# Patient Record
Sex: FEMALE | Race: BLACK OR AFRICAN AMERICAN | Employment: OTHER | URBAN - METROPOLITAN AREA
[De-identification: names, ages, dates, MRNs, and addresses within clinical notes are randomized per-mention and may not be internally consistent; named-entity substitution may affect disease eponyms.]

---

## 2017-11-24 PROBLEM — N39.0 UTI (URINARY TRACT INFECTION): Status: ACTIVE | Noted: 2017-11-24

## 2017-11-24 PROBLEM — I72.0 CAROTID ARTERY ANEURYSM (HCC): Status: ACTIVE | Noted: 2017-11-24

## 2017-11-24 PROBLEM — A41.9 SEPSIS (HCC): Status: ACTIVE | Noted: 2017-11-24

## 2017-11-25 PROBLEM — R50.9 FEVER: Status: ACTIVE | Noted: 2017-11-25

## 2017-11-25 PROBLEM — R78.81 PNEUMOCOCCAL BACTEREMIA: Status: ACTIVE | Noted: 2017-11-25

## 2017-11-25 PROBLEM — B95.3 PNEUMOCOCCAL BACTEREMIA: Status: ACTIVE | Noted: 2017-11-25

## 2017-11-27 PROBLEM — R91.1 LUNG NODULE: Status: ACTIVE | Noted: 2017-11-27

## 2017-12-05 LAB
ALBUMIN SERPL-MCNC: 3 G/DL
ALP BLD-CCNC: 67 U/L
ALT SERPL-CCNC: 45 U/L
ANION GAP SERPL CALCULATED.3IONS-SCNC: ABNORMAL MMOL/L
AST SERPL-CCNC: 29 U/L
BASOPHILS ABSOLUTE: 0 /ΜL
BASOPHILS RELATIVE PERCENT: 0 %
BILIRUB SERPL-MCNC: 0 MG/DL (ref 0.1–1.4)
BUN BLDV-MCNC: 10 MG/DL
CALCIUM SERPL-MCNC: 9.7 MG/DL
CHLORIDE BLD-SCNC: 102 MMOL/L
CO2: 28 MMOL/L
CREAT SERPL-MCNC: 0.62 MG/DL
EOSINOPHILS ABSOLUTE: 0.1 /ΜL
EOSINOPHILS RELATIVE PERCENT: 1 %
GFR CALCULATED: 60
GLUCOSE BLD-MCNC: 99 MG/DL
HCT VFR BLD CALC: 39 % (ref 36–46)
HEMOGLOBIN: 12.3 G/DL (ref 12–16)
LYMPHOCYTES ABSOLUTE: 1.5 /ΜL
LYMPHOCYTES RELATIVE PERCENT: 26 %
MCH RBC QN AUTO: 28 PG
MCHC RBC AUTO-ENTMCNC: 32 G/DL
MCV RBC AUTO: 88 FL
MONOCYTES ABSOLUTE: 0.4 /ΜL
MONOCYTES RELATIVE PERCENT: 7 %
NEUTROPHILS ABSOLUTE: 3.7 /ΜL
NEUTROPHILS RELATIVE PERCENT: 66 %
PDW BLD-RTO: 13.4 %
PLATELET # BLD: 577 K/ΜL
PMV BLD AUTO: NORMAL FL
POTASSIUM SERPL-SCNC: 4.7 MMOL/L
RBC # BLD: 4.37 10^6/ΜL
SODIUM BLD-SCNC: 138 MMOL/L
TOTAL PROTEIN: 6.9
WBC # BLD: 5.7 10^3/ML

## 2019-04-30 ENCOUNTER — OFFICE VISIT (OUTPATIENT)
Dept: INTERNAL MEDICINE CLINIC | Age: 74
End: 2019-04-30
Payer: MEDICARE

## 2019-04-30 VITALS
BODY MASS INDEX: 25.83 KG/M2 | HEART RATE: 63 BPM | OXYGEN SATURATION: 98 % | DIASTOLIC BLOOD PRESSURE: 94 MMHG | SYSTOLIC BLOOD PRESSURE: 178 MMHG | TEMPERATURE: 98.5 F | WEIGHT: 155.2 LBS

## 2019-04-30 DIAGNOSIS — Z12.11 SCREEN FOR COLON CANCER: ICD-10-CM

## 2019-04-30 DIAGNOSIS — R91.8 ABNORMAL CT SCAN, LUNG: ICD-10-CM

## 2019-04-30 DIAGNOSIS — E78.5 DYSLIPIDEMIA: Primary | ICD-10-CM

## 2019-04-30 DIAGNOSIS — Z12.31 SCREENING MAMMOGRAM, ENCOUNTER FOR: ICD-10-CM

## 2019-04-30 DIAGNOSIS — I10 UNCONTROLLED HYPERTENSION: ICD-10-CM

## 2019-04-30 PROCEDURE — 99204 OFFICE O/P NEW MOD 45 MIN: CPT | Performed by: INTERNAL MEDICINE

## 2019-04-30 RX ORDER — LOSARTAN POTASSIUM 25 MG/1
25 TABLET ORAL DAILY
Qty: 90 TABLET | Refills: 1 | Status: SHIPPED | OUTPATIENT
Start: 2019-04-30 | End: 2019-05-31 | Stop reason: SDUPTHER

## 2019-04-30 SDOH — SOCIAL STABILITY: SOCIAL INSECURITY: WITHIN THE LAST YEAR, HAVE YOU BEEN AFRAID OF YOUR PARTNER OR EX-PARTNER?: NO

## 2019-04-30 SDOH — SOCIAL STABILITY: SOCIAL INSECURITY
WITHIN THE LAST YEAR, HAVE YOU BEEN KICKED, HIT, SLAPPED, OR OTHERWISE PHYSICALLY HURT BY YOUR PARTNER OR EX-PARTNER?: NO

## 2019-04-30 SDOH — HEALTH STABILITY: PHYSICAL HEALTH: ON AVERAGE, HOW MANY MINUTES DO YOU ENGAGE IN EXERCISE AT THIS LEVEL?: 60 MIN

## 2019-04-30 SDOH — SOCIAL STABILITY: SOCIAL NETWORK
IN A TYPICAL WEEK, HOW MANY TIMES DO YOU TALK ON THE PHONE WITH FAMILY, FRIENDS, OR NEIGHBORS?: MORE THAN THREE TIMES A WEEK

## 2019-04-30 SDOH — SOCIAL STABILITY: SOCIAL NETWORK
DO YOU BELONG TO ANY CLUBS OR ORGANIZATIONS SUCH AS CHURCH GROUPS UNIONS, FRATERNAL OR ATHLETIC GROUPS, OR SCHOOL GROUPS?: YES

## 2019-04-30 SDOH — HEALTH STABILITY: MENTAL HEALTH
STRESS IS WHEN SOMEONE FEELS TENSE, NERVOUS, ANXIOUS, OR CAN'T SLEEP AT NIGHT BECAUSE THEIR MIND IS TROUBLED. HOW STRESSED ARE YOU?: TO SOME EXTENT

## 2019-04-30 SDOH — SOCIAL STABILITY: SOCIAL INSECURITY
WITHIN THE LAST YEAR, HAVE TO BEEN RAPED OR FORCED TO HAVE ANY KIND OF SEXUAL ACTIVITY BY YOUR PARTNER OR EX-PARTNER?: NO

## 2019-04-30 SDOH — HEALTH STABILITY: PHYSICAL HEALTH: ON AVERAGE, HOW MANY DAYS PER WEEK DO YOU ENGAGE IN MODERATE TO STRENUOUS EXERCISE (LIKE A BRISK WALK)?: 2 DAYS

## 2019-04-30 SDOH — SOCIAL STABILITY: SOCIAL NETWORK: HOW OFTEN DO YOU GET TOGETHER WITH FRIENDS OR RELATIVES?: ONCE A WEEK

## 2019-04-30 SDOH — SOCIAL STABILITY: SOCIAL NETWORK: HOW OFTEN DO YOU ATTENT MEETINGS OF THE CLUB OR ORGANIZATION YOU BELONG TO?: MORE THAN 4 TIMES PER YEAR

## 2019-04-30 SDOH — SOCIAL STABILITY: SOCIAL NETWORK: HOW OFTEN DO YOU ATTEND CHURCH OR RELIGIOUS SERVICES?: NEVER

## 2019-04-30 SDOH — SOCIAL STABILITY: SOCIAL INSECURITY: WITHIN THE LAST YEAR, HAVE YOU BEEN HUMILIATED OR EMOTIONALLY ABUSED IN OTHER WAYS BY YOUR PARTNER OR EX-PARTNER?: NO

## 2019-04-30 SDOH — HEALTH STABILITY: MENTAL HEALTH: HOW MANY STANDARD DRINKS CONTAINING ALCOHOL DO YOU HAVE ON A TYPICAL DAY?: 5 OR 6

## 2019-04-30 SDOH — HEALTH STABILITY: MENTAL HEALTH: HOW OFTEN DO YOU HAVE A DRINK CONTAINING ALCOHOL?: 2-4 TIMES A MONTH

## 2019-04-30 SDOH — SOCIAL STABILITY: SOCIAL NETWORK: ARE YOU MARRIED, WIDOWED, DIVORCED, SEPARATED, NEVER MARRIED, OR LIVING WITH A PARTNER?: NEVER MARRIED

## 2019-04-30 ASSESSMENT — PATIENT HEALTH QUESTIONNAIRE - PHQ9
2. FEELING DOWN, DEPRESSED OR HOPELESS: 0
1. LITTLE INTEREST OR PLEASURE IN DOING THINGS: 0
SUM OF ALL RESPONSES TO PHQ9 QUESTIONS 1 & 2: 0
SUM OF ALL RESPONSES TO PHQ QUESTIONS 1-9: 0
SUM OF ALL RESPONSES TO PHQ QUESTIONS 1-9: 0

## 2019-05-13 ASSESSMENT — ENCOUNTER SYMPTOMS
COUGH: 0
WHEEZING: 0
ABDOMINAL PAIN: 0
CONSTIPATION: 0
EYES NEGATIVE: 1
APNEA: 0
ALLERGIC/IMMUNOLOGIC NEGATIVE: 1
BLOOD IN STOOL: 0
BLURRED VISION: 0
STRIDOR: 0
ANAL BLEEDING: 0
CHOKING: 0
NAUSEA: 0
SHORTNESS OF BREATH: 0
DIARRHEA: 0
BACK PAIN: 0
VOMITING: 0
RHINORRHEA: 0
RECTAL PAIN: 0
ORTHOPNEA: 0
CHEST TIGHTNESS: 0
FACIAL SWELLING: 0
ABDOMINAL DISTENTION: 0
GASTROINTESTINAL NEGATIVE: 1
EYE DISCHARGE: 0

## 2019-05-13 NOTE — PROGRESS NOTES
[address], [place], [place]    May 13, 2019  Reji Martinezvictorino  1945    HPI:  Naman Alcantar is a 74 y.o. female being seen today for *.c     Presents to establish care. She has not seen a doctor in several years. She has fears about medication. Past Medical History:  No date: Carotid artery aneurysm (HCC)  No date: Hypertension  No date: Lung nodule  History reviewed. No pertinent surgical history. 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[place] [address]    Social Needs      Financial resource strain: Not on file      Food insecurity:        Worry: Not on file        Inability: Not on file      Transportation needs:        Medical: Not on file        Non-medical: Not on file    Tobacco Use      Smoking status: Current Every Day Smoker        Packs/day: 1.00        Years: 50.00        Pack years: 50        Types: Cigarettes        Start date: 1968      Smokeless tobacco: Never Used    Substance and Sexual Activity      Alcohol use: Yes        Alcohol/week: 1.2 oz        Types: 2 Cans of beer per week        Frequency: 2-4 times a month        Drinks per session: 5 or 6        Binge frequency: Weekly        Comment: occasionally      Drug use: No      Sexual activity: Not Currently    Lifestyle      Physical activity:        Days per week: 2 days        Minutes per session: 60 min      Stress:  To some extent    Relationships      Social connections: 90 tablet 1     No current facility-administered medications for this visit. Review. phx  \\ of Systems:    Review of Systems   Constitutional: Negative. Negative for malaise/fatigue. HENT: Negative for congestion, ear discharge, facial swelling, hearing loss, nosebleeds, postnasal drip, rhinorrhea, sneezing and tinnitus. Eyes: Negative. Negative for blurred vision and discharge. Respiratory: Negative for apnea, cough, choking, chest tightness, shortness of breath, wheezing and stridor. Cardiovascular: Negative. Negative for chest pain, palpitations, orthopnea and leg swelling. Gastrointestinal: Negative. Negative for abdominal distention, abdominal pain, anal bleeding, blood in stool, constipation, diarrhea, nausea, rectal pain and vomiting. Endocrine: Negative. Genitourinary: Negative for difficulty urinating, dyspareunia, dysuria, frequency, genital sores, hematuria, menstrual problem, pelvic pain, vaginal discharge and vaginal pain. Musculoskeletal: Negative. Negative for arthralgias, back pain, gait problem, joint swelling, myalgias and neck pain. Skin: Negative. Negative for rash and wound. Allergic/Immunologic: Negative. Neurological: Negative. Negative for dizziness, tremors, syncope, speech difficulty, weakness, light-headedness, numbness and headaches. Hematological: Negative for adenopathy. Does not bruise/bleed easily. Psychiatric/Behavioral: Negative. Negative for agitation, behavioral problems, confusion, decreased concentration, dysphoric mood, hallucinations, self-injury, sleep disturbance and suicidal ideas. The patient is not nervous/anxious and is not hyperactive. All other systems reviewed and are negative.       Physical Exam:  @DOS@    No Known Allergies    Current Outpatient Medications   Medication Sig Dispense Refill    losartan (COZAAR) 25 MG tablet Take 1 tablet by mouth daily 90 tablet 1     No current facility-administered medications for this visit. Vitals:    04/30/19 1359   BP: (!) 178/94   Pulse: 63   Temp: 98.5 °F (36.9 °C)   TempSrc: Oral   SpO2: 98%   Weight: 155 lb 3.2 oz (70.4 kg)     Body mass index is 25.83 kg/m². Wt Readings from Last 3 Encounters:   04/30/19 155 lb 3.2 oz (70.4 kg)   11/30/17 147 lb 6.4 oz (66.9 kg)   04/03/12 155 lb (70.3 kg)     BP Readings from Last 3 Encounters:   04/30/19 (!) 178/94   12/01/17 (!) 150/69   04/03/12 140/80       Physical Exam   Constitutional: She is oriented to person, place, and time. She appears well-developed and well-nourished. HENT:   Head: Normocephalic and atraumatic. Eyes: Pupils are equal, round, and reactive to light. Conjunctivae and EOM are normal.   Neck: Normal range of motion. Neck supple. Cardiovascular: Normal rate, regular rhythm, normal heart sounds and intact distal pulses. Pulmonary/Chest: Effort normal and breath sounds normal. No respiratory distress. Musculoskeletal: Normal range of motion. She exhibits no edema. Neurological: She is alert and oriented to person, place, and time. Skin: Skin is warm. Psychiatric: She has a normal mood and affect. Her behavior is normal. Judgment and thought content normal.   Nursing note and vitals reviewed. CONSTITUTION: Well-developed, Well-nourished, in no acute distress. HENT: Normocephalic  NECK: ROM within normal limits  PULMONARY: Pulmonary effort and breath sounds are normal.  No respiratory distress, wheezes, rales, or chest tenderness noted  CARDIOVASCULAR: Regular rate and rhythm  ABDOMINAL: Abdomen soft, non-.distended,non-tender, no masses, guarding, or rebound tenderness, bowels sounds are within normal limits  CHEST: no retractions or accessory muscle use, no masses  AXILLA: no lymphadenopathy  NEUROLOGICAL: Grossly intact  EXTREMITIES: DP / TD pulses are within normal limits  SKIN: Warm and Dry  HAIR: Evenly distributed  NAILS: Without injury or abnormality    Assessment:  1. Dyslipidemia    2. Uncontrolled hypertension    3. Screening mammogram, encounter for    4. Screen for colon cancer    5. Abnormal CT scan, lung        Plan:  Cami Duarte was seen today for establish care. Diagnoses and all orders for this visit:    Dyslipidemia  -     Lipid Panel; Future    Uncontrolled hypertension  -     Comprehensive Metabolic Panel  -     Microalbumin / Creatinine Urine Ratio  -     losartan (COZAAR) 25 MG tablet; Take 1 tablet by mouth daily    Screening mammogram, encounter for  -     LIO DIGITAL SCREEN W OR WO CAD BILATERAL; Future    Screen for colon cancer  -     Direct Screening Colonoscopy Referral    Abnormal CT scan, lung  -     Low Dose Chest CT-Abnormal Lung Screen Follow up; Future        Follow-up    Return in about 1 month (around 5/28/2019) for Hypertension. Narrative   EXAMINATION:   CTA OF THE HEAD AND NECK WITH CONTRAST 11/24/2017 2:36 pm:       TECHNIQUE:   CTA of the head and neck was performed with the administration of intravenous   contrast. Multiplanar reformatted images are provided for review.  MIP images   are provided for review. Stenosis of the internal carotid arteries measured   using NASCET criteria. Dose modulation, iterative reconstruction, and/or   weight based adjustment of the mA/kV was utilized to reduce the radiation   dose to as low as reasonably achievable.       COMPARISON:   None.       HISTORY:   ORDERING PHYSICIAN PROVIDED HISTORY: Aneurysm   TECHNOLOGIST PROVIDED HISTORY:   Technologist Provided Reason for Exam: Fall (was in bedroom on floor since @   0600 this morning. Pt was c/o back pain)   Acuity: Acute   Type of Encounter: Initial       FINDINGS:   CTA NECK:       AORTIC ARCH/GREAT VESSELS: There is a normal branch pattern of the aortic   arch.  No significant stenosis is seen of the innominate artery or subclavian   arteries within limitations of motion and streak artifact.       CAROTID ARTERIES: Bilateral common and internal carotid arteries appear   widely patent without evidence of flow-limiting stenosis according to NASCET   criteria.       VERTEBRAL ARTERIES: The vertebral arteries both arise from the subclavian   arteries and are normal in caliber without evidence of flow limiting stenosis   or dissection.       SOFT TISSUES: Right upper lobe linear opacity may indicate atelectasis or   focal scarring.  Bilateral submandibular ductal prominence without evidence   of sialolithiasis.  Nonspecific 7 mm soft tissue nodule inferiorly in the   left parotid gland.  Symmetric mild superior ophthalmic vein dilation.       BONES: Maxillary and mandibular periodontal disease.       CTA HEAD:       ANTERIOR CIRCULATION: Bilateral intracranial internal carotid arteries   demonstrated mild multifocal narrowing due to calcified plaque.       Hypoplastic right anterior cerebral artery A1 segment.  Bilateral A1/A2 and   M1/M2 segments are patent.       POSTERIOR CIRCULATION: The posterior cerebral arteries demonstrate no focal   stenosis. The vertebral and basilar arteries appear unremarkable.  The right   vertebral artery appears to terminate in PICA, with minimal if any   contribution to the basilar artery.  Near fetal origin of both posterior   cerebral arteries.       ANEURYSM: There is a 14 x 9 x 8 mm saccular aneurysm of the left internal   carotid artery communicating segment with wide neck measuring approximately 4   mm (series 2, image 178).  The left posterior communicating artery appears to   originate from the aneurysm (series 2, image 176).  The aneurysm neck is in   close proximity to the left anterior and middle cerebral artery origins.       An additional 3-4 mm focal outpouching is directed laterally at the level of   the left carotid terminus (series 2, image 180).        There is a 3 mm aneurysm directed posteriorly from the right internal carotid   artery at level of posterior communicating artery origin.       BRAIN: No evidence of mass effect or abnormal enhancement.           Impression   Wide neck, large aneurysm of the left internal carotid artery communicating   segment measuring up to 1.4 cm in craniocaudal dimension.  The aneurysm   demonstrates incorporation of the left posterior communicating artery origin.       3-4 mm aneurysm at level of left carotid terminus.       3 mm aneurysm at level of right posterior communicating artery origin.       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  There is no asymmetric cavernous sinus   enhancement on the current examination. Narrative   EXAMINATION:   CT OF THE CHEST WITHOUT CONTRAST 11/25/2017 10:31 am       TECHNIQUE:   CT of the chest was performed without the administration of intravenous   contrast. Multiplanar reformatted images are provided for review. Dose   modulation, iterative reconstruction, and/or weight based adjustment of the   mA/kV was utilized to reduce the radiation dose to as low as reasonably   achievable.       COMPARISON:   None.       HISTORY:   ORDERING PHYSICIAN PROVIDED HISTORY: DYSPNEA, ON EXERTION   TECHNOLOGIST PROVIDED HISTORY:   Technologist Provided Reason for Exam: DYSPNEA, ON EXERTION   Acuity: Unknown   Type of Encounter: Unknown       FINDINGS:   Mediastinum: No acute noncontrast findings of the mediastinal structures.  No   evidence of mediastinal adenopathy.  No pericardial effusion.       Lungs/pleura:  In the right apex there is a bandlike small area of airspace   disease.  In the right mid lung anteriorly there is an 8 mm noncalcified   nodule in the middle lobe.  There is mild dependent atelectasis of both   lungs.  There is a 6 mm calcified granuloma of the left apex.  No pleural   effusion.  No pneumothorax.  No septal thickening to suggest pulmonary edema.       Upper Abdomen: Negative on noncontrast evaluation of the upper abdomen.  No   acute findings.       Soft Tissues/Bones: No axillary adenopathy.  No acute osseous findings.           Impression   Mild bilateral dependent atelectasis.  No evidence of acute process of the   chest otherwise.       Indeterminate incidental 8 mm noncalcified right middle lobe pulmonary   nodule.  Recommend CT of the chest in 6-12 months.  Attention to the right   apical bandlike opacity is also recommended at that time.       RECOMMENDATIONS:   Fleischner Society guidelines for follow-up and management of incidentally   detected pulmonary nodules:       Single Solid Nodule:       Nodule size equals 6-8 mm   In a low-risk patient, CT at 6-12 months, then consider CT at 18-24 months. In a high-risk patient, CT at 6-12 months, then CT at 18-24 months.       - Low risk patients include individuals with minimal or absent history of   smoking and other known risk factors.       - High risk patients include individuals with a history or smoking or known   risk factors.       Radiology 2017 http://pubs. rsna.org/doi/full/10.1148/radiol. 3910690094       Assessment/Plan:  Ulysses Nye was seen today for establish care. Diagnoses and all orders for this visit:    Dyslipidemia  -     Lipid Panel; Future    Uncontrolled hypertension  -     Comprehensive Metabolic Panel  -     Microalbumin / Creatinine Urine Ratio  -     losartan (COZAAR) 25 MG tablet; Take 1 tablet by mouth daily      Screening mammogram, encounter for  -     LIO DIGITAL SCREEN W OR WO CAD BILATERAL; Future    Screen for colon cancer  -     Direct Screening Colonoscopy Referral    Abnormal CT scan, lung  -     Low Dose Chest CT-Abnormal Lung Screen Follow up; Future      Return in about 1 month (around 5/28/2019) for Hypertension. A total of 45 minutes spent with the patient today greater than 50% in counseling regarding these issues. I have reviewed the patient's medical history in detail and updated the computerized patient record.

## 2019-05-31 ENCOUNTER — OFFICE VISIT (OUTPATIENT)
Dept: INTERNAL MEDICINE CLINIC | Age: 74
End: 2019-05-31
Payer: MEDICARE

## 2019-05-31 VITALS
DIASTOLIC BLOOD PRESSURE: 80 MMHG | HEART RATE: 68 BPM | SYSTOLIC BLOOD PRESSURE: 152 MMHG | TEMPERATURE: 98.6 F | BODY MASS INDEX: 25.58 KG/M2 | RESPIRATION RATE: 16 BRPM | OXYGEN SATURATION: 98 % | WEIGHT: 153.7 LBS

## 2019-05-31 DIAGNOSIS — I10 UNCONTROLLED HYPERTENSION: ICD-10-CM

## 2019-05-31 PROCEDURE — 99213 OFFICE O/P EST LOW 20 MIN: CPT | Performed by: INTERNAL MEDICINE

## 2019-05-31 RX ORDER — LOSARTAN POTASSIUM 100 MG/1
100 TABLET ORAL DAILY
Qty: 90 TABLET | Refills: 1 | Status: SHIPPED | OUTPATIENT
Start: 2019-05-31

## 2019-05-31 RX ORDER — ASCORBIC ACID 500 MG
500 TABLET ORAL DAILY
COMMUNITY

## 2019-05-31 NOTE — PROGRESS NOTES
Subjective:      Patient ID: Latisha Roe is a 74 y.o. female. Hypertension   This is a chronic problem. The current episode started more than 1 month ago. The problem has been gradually improving since onset. The problem is uncontrolled. Pertinent negatives include no anxiety, blurred vision, chest pain, headaches, malaise/fatigue, neck pain, orthopnea, palpitations, peripheral edema, PND, shortness of breath or sweats. There are no associated agents to hypertension. There are no known risk factors for coronary artery disease. Past treatments include angiotensin blockers. The current treatment provides no improvement. There is no history of angina, kidney disease, CAD/MI, CVA, heart failure, left ventricular hypertrophy, PVD or retinopathy. Review of Systems   Constitutional: Negative for malaise/fatigue. Eyes: Negative for blurred vision. Respiratory: Negative for shortness of breath. Cardiovascular: Negative for chest pain, palpitations, orthopnea and PND. Musculoskeletal: Negative for neck pain. Neurological: Negative for headaches. All other systems reviewed and are negative. No Known Allergies    Current Outpatient Medications   Medication Sig Dispense Refill    vitamin C (ASCORBIC ACID) 500 MG tablet Take 500 mg by mouth daily      losartan (COZAAR) 25 MG tablet Take 1 tablet by mouth daily 90 tablet 1     No current facility-administered medications for this visit. Vitals:    05/31/19 1436   BP: (!) 160/84   Pulse: 68   Resp: 16   Temp: 98.6 °F (37 °C)   TempSrc: Oral   SpO2: 98%   Weight: 153 lb 11.2 oz (69.7 kg)     Body mass index is 25.58 kg/m².      Wt Readings from Last 3 Encounters:   05/31/19 153 lb 11.2 oz (69.7 kg)   04/30/19 155 lb 3.2 oz (70.4 kg)   11/30/17 147 lb 6.4 oz (66.9 kg)     BP Readings from Last 3 Encounters:   05/31/19 (!) 160/84   04/30/19 (!) 178/94   12/01/17 (!) 150/69       Objective:   Physical Exam   Constitutional: She is oriented to person, place, and time. She appears well-developed and well-nourished. HENT:   Head: Normocephalic and atraumatic. Eyes: Pupils are equal, round, and reactive to light. Conjunctivae and EOM are normal.   Neck: Normal range of motion. Neck supple. Cardiovascular: Normal rate, regular rhythm, normal heart sounds and intact distal pulses. Pulmonary/Chest: Effort normal and breath sounds normal. No respiratory distress. Musculoskeletal: Normal range of motion. She exhibits no edema. Neurological: She is alert and oriented to person, place, and time. Skin: Skin is warm. Psychiatric: She has a normal mood and affect. Her behavior is normal. Judgment and thought content normal.   Nursing note and vitals reviewed. Assessment/Plan:  Loretta Robins was seen today for hypertension. Diagnoses and all orders for this visit:    Uncontrolled hypertension  -     losartan (COZAAR) 100 MG tablet; Take 1 tablet by mouth daily  -     Lipid Panel; Future  -     Comprehensive Metabolic Panel; Future  -     Microalbumin / Creatinine Urine Ratio      Return for 3 weeks for nurse visit, 7 weeks Dr. Nicole Isaac.       Park Mendiola MD

## 2019-06-01 ASSESSMENT — ENCOUNTER SYMPTOMS
ORTHOPNEA: 0
SHORTNESS OF BREATH: 0
BLURRED VISION: 0

## 2019-06-21 ENCOUNTER — NURSE ONLY (OUTPATIENT)
Dept: INTERNAL MEDICINE CLINIC | Age: 74
End: 2019-06-21

## 2019-06-21 VITALS — OXYGEN SATURATION: 97 % | HEART RATE: 74 BPM | SYSTOLIC BLOOD PRESSURE: 148 MMHG | DIASTOLIC BLOOD PRESSURE: 72 MMHG

## 2019-06-21 DIAGNOSIS — I10 ESSENTIAL HYPERTENSION: Primary | ICD-10-CM

## 2019-07-10 DIAGNOSIS — I10 UNCONTROLLED HYPERTENSION: ICD-10-CM

## 2019-07-10 LAB
A/G RATIO: 1.8 (ref 1.1–2.2)
ALBUMIN SERPL-MCNC: 4.4 G/DL (ref 3.4–5)
ALP BLD-CCNC: 79 U/L (ref 40–129)
ALT SERPL-CCNC: 21 U/L (ref 10–40)
ANION GAP SERPL CALCULATED.3IONS-SCNC: 10 MMOL/L (ref 3–16)
AST SERPL-CCNC: 22 U/L (ref 15–37)
BILIRUB SERPL-MCNC: 0.3 MG/DL (ref 0–1)
BUN BLDV-MCNC: 17 MG/DL (ref 7–20)
CALCIUM SERPL-MCNC: 10 MG/DL (ref 8.3–10.6)
CHLORIDE BLD-SCNC: 106 MMOL/L (ref 99–110)
CHOLESTEROL, TOTAL: 239 MG/DL (ref 0–199)
CO2: 28 MMOL/L (ref 21–32)
CREAT SERPL-MCNC: 0.6 MG/DL (ref 0.6–1.2)
CREATININE URINE: 152.3 MG/DL (ref 28–259)
GFR AFRICAN AMERICAN: >60
GFR NON-AFRICAN AMERICAN: >60
GLOBULIN: 2.4 G/DL
GLUCOSE BLD-MCNC: 98 MG/DL (ref 70–99)
HDLC SERPL-MCNC: 94 MG/DL (ref 40–60)
LDL CHOLESTEROL CALCULATED: 130 MG/DL
MICROALBUMIN UR-MCNC: <1.2 MG/DL
MICROALBUMIN/CREAT UR-RTO: NORMAL MG/G (ref 0–30)
POTASSIUM SERPL-SCNC: 4.8 MMOL/L (ref 3.5–5.1)
SODIUM BLD-SCNC: 144 MMOL/L (ref 136–145)
TOTAL PROTEIN: 6.8 G/DL (ref 6.4–8.2)
TRIGL SERPL-MCNC: 74 MG/DL (ref 0–150)
VLDLC SERPL CALC-MCNC: 15 MG/DL

## 2019-07-19 ENCOUNTER — OFFICE VISIT (OUTPATIENT)
Dept: INTERNAL MEDICINE CLINIC | Age: 74
End: 2019-07-19
Payer: MEDICARE

## 2019-07-19 VITALS
WEIGHT: 157.2 LBS | HEIGHT: 63 IN | SYSTOLIC BLOOD PRESSURE: 132 MMHG | OXYGEN SATURATION: 96 % | TEMPERATURE: 98.2 F | HEART RATE: 80 BPM | BODY MASS INDEX: 27.85 KG/M2 | DIASTOLIC BLOOD PRESSURE: 78 MMHG | RESPIRATION RATE: 16 BRPM

## 2019-07-19 DIAGNOSIS — I10 ESSENTIAL HYPERTENSION: Primary | ICD-10-CM

## 2019-07-19 DIAGNOSIS — E78.5 DYSLIPIDEMIA: ICD-10-CM

## 2019-07-19 PROCEDURE — 99214 OFFICE O/P EST MOD 30 MIN: CPT | Performed by: INTERNAL MEDICINE

## 2019-07-19 RX ORDER — ROSUVASTATIN CALCIUM 10 MG/1
10 TABLET, COATED ORAL NIGHTLY
Qty: 90 TABLET | Refills: 1 | Status: SHIPPED | OUTPATIENT
Start: 2019-07-19

## 2019-07-19 NOTE — PATIENT INSTRUCTIONS
Patient Education        red yeast rice  Pronunciation:  RED YEAST RICE  Brand:  Red Yeast Rice  What is the most important information I should know about red yeast rice? Follow all directions on the product label and package. Tell each of your healthcare providers about all your medical conditions, allergies, and all medicines you use. What is red yeast rice? Red yeast rice is a product that is made by fermenting red rice with a certain type of yeast. Red yeast rice is also known as Cholestin, Hypocol, Xuezhikang, or Zhitai. Red yeast rice supplements are not the same as red yeast rice that is sold in [place]. Red yeast rice has been used in alternative medicine as a likely effective aid in reducing levels of \"bad\" cholesterol (low-density lipoprotein, or LDL) and triglycerides in the blood. Red yeast rice has been used as a possibly effective aid in reducing the risk of heart disease, heart attacks, or death in people with a history of heart attack. Red yeast rice has also been used as a possibly effective aid in lowering cholesterol and triglyceride levels in people with human immunodeficiency virus (HIV). Red yeast rice has been used to treat high blood pressure. However, research has shown that red yeast rice may not be effective in treating this condition. Other uses not proven with research have included: lowering cholesterol levels in people with diabetes or liver disease; improving blood circulation; and treating diarrhea, indigestion, or other stomach problems. It is not certain whether red yeast rice is effective in treating any medical condition. Medicinal use of this product has not been approved by the FDA. Red yeast rice should not be used in place of medication prescribed for you by your doctor. Red yeast rice is often sold as an herbal supplement.  There are no regulated manufacturing standards in place for many herbal compounds and some marketed supplements have been Whether you are treated by a medical doctor or a practitioner trained in the use of natural medicines/supplements, make sure all your healthcare providers know about all of your medical conditions and treatments. Remember, keep this and all other medicines out of the reach of children, never share your medicines with others, and use this medication only for the indication prescribed. Every effort has been made to ensure that the information provided by [address] is accurate, up-to-date, and complete, but no guarantee is made to that effect. Drug information contained herein may be time sensitive. [facility] information has been compiled for use by healthcare practitioners and consumers in the [facility] and therefore [facility] does not warrant that uses outside of the [facility] are appropriate, unless specifically indicated otherwise. [facility]'s drug information does not endorse drugs, diagnose patients or recommend therapy. [facility]'s drug information is an informational resource designed to assist licensed healthcare practitioners in caring for their patients and/or to serve consumers viewing this service as a supplement to, and not a substitute for, the expertise, skill, knowledge and judgment of healthcare practitioners. The absence of a warning for a given drug or drug combination in no way should be construed to indicate that the drug or drug combination is safe, effective or appropriate for any given patient. [facility] does not assume any responsibility for any aspect of healthcare administered with the aid of information [facility] provides. The information contained herein is not intended to cover all possible uses, directions, precautions, warnings, drug interactions, allergic reactions, or adverse effects. If you have questions about the drugs you are taking, check with your doctor, nurse or pharmacist.  Copyright 9726-2366 [address] [place] [address]. Version: 7.01. Revision date: 5/6/2016.   Care instructions adapted under license by [facility] ([facility]). If you have questions about a medical condition or this instruction, always ask your healthcare professional. [address] any warranty or liability for your use of this information. Patient Education        Learning About High Cholesterol  What is high cholesterol? Cholesterol is a type of fat in your blood. It is needed for many body functions, such as making new cells. Cholesterol is made by your body. It also comes from food you eat. If you have too much cholesterol, it starts to build up in your arteries. This is called hardening of the arteries, or atherosclerosis. High cholesterol raises your risk of a heart attack and stroke. There are different types of cholesterol. LDL is the \"bad\" cholesterol. High LDL can raise your risk for heart disease, heart attack, and stroke. HDL is the \"good\" cholesterol. High HDL is linked with a lower risk for heart disease, heart attack, and stroke. Your cholesterol levels help your doctor find out your risk for having a heart attack or stroke. How can you prevent high cholesterol? A heart-healthy lifestyle can help you prevent high cholesterol. This lifestyle helps lower your risk for a heart attack and stroke. · Eat heart-healthy foods. ? Eat fruits, vegetables, whole grains (like oatmeal), dried beans and peas, nuts and seeds, soy products (like tofu), and fat-free or low-fat dairy products. ? Replace butter, margarine, and hydrogenated or partially hydrogenated oils with olive and canola oils. (Canola oil margarine without trans fat is fine.)  ? Replace red meat with fish, poultry, and soy protein (like tofu). ? Limit processed and packaged foods like chips, crackers, and cookies. · Be active. Exercise can improve your cholesterol level. Get at least 30 minutes of exercise on most days of the week. Walking is a good choice.  You also may want to do other activities, such as running, thigh of your rear leg. 3. Hold the stretch for at least 15 to 30 seconds. Repeat with your other leg. 4. Do 2 to 4 times on each side. Wall sit    1. Stand with your back 10 to 12 inches away from a wall. 2. Lean into the wall until your back is flat against it. 3. Slowly slide down until your knees are slightly bent, pressing your lower back into the wall. 4. Hold for about 6 seconds, then slide back up the wall. 5. Repeat 8 to 12 times. Follow-up care is a key part of your treatment and safety. Be sure to make and go to all appointments, and call your doctor if you are having problems. It's also a good idea to know your test results and keep a list of the medicines you take. Where can you learn more? Go to https://[employer]. org and sign in to your [facility] account. Enter H876 in the [employer] box to learn more about \"Low Back Pain: Exercises. \"     If you do not have an account, please click on the \"Sign Up Now\" link. Current as of: September 20, 2018  Content Version: 12.0  © 1203-8303 [employer], [employer]. Care instructions adapted under license by [facility] ([facility]). If you have questions about a medical condition or this instruction, always ask your healthcare professional. [address] any warranty or liability for your use of this information. Patient Education        Sacroiliac Pain: Exercises  Your Care Instructions  Here are some examples of typical rehabilitation exercises for your condition. Start each exercise slowly. Ease off the exercise if you start to have pain. Your doctor or physical therapist will tell you when you can start these exercises and which ones will work best for you. How to do the exercises  Knee-to-chest stretch    1. Do not do the knee-to-chest exercise if it causes or increases back or leg pain. 2. Lie on your back with your knees bent and your feet flat on the floor.  You can put a small pillow under your head and neck if it is more comfortable. 3. Grasp your hands under one knee and bring the knee to your chest, keeping the other foot flat on the floor. 4. Keep your lower back pressed to the floor. Hold for at least 15 to 30 seconds. 5. Relax and lower the knee to the starting position. Repeat with the other leg. 6. Repeat 2 to 4 times with each leg. 7. To get more stretch, keep your other leg flat on the floor while pulling your knee to your chest.    Bridging    1. Lie on your back with both knees bent. Your knees should be bent about 90 degrees. 2. Tighten your belly muscles by pulling in your belly button toward your spine. Then push your feet into the floor, squeeze your buttocks, and lift your hips off the floor until your shoulders, hips, and knees are all in a straight line. 3. Hold for about 6 seconds as you continue to breathe normally, and then slowly lower your hips back down to the floor and rest for up to 10 seconds. 4. Repeat 8 to 12 times. Hip extension    1. Get down on your hands and knees on the floor. 2. Keeping your back and neck straight, lift one leg straight out behind you. When you lift your leg, keep your hips level. Don't let your back twist, and don't let your hip drop toward the floor. 3. Hold for 6 seconds. Repeat 8 to 12 times with each leg. 4. If you feel steady and strong when you do this exercise, you can make it more difficult. To do this, when you lift your leg, also lift the opposite arm straight out in front of you. For example, lift the left leg and the right arm at the same time. (This is sometimes called the \"bird dog exercise. \") Hold for 6 seconds, and repeat 8 to 12 times on each side. Clamshell    1. Lie on your side with a pillow under your head. Keep your feet and knees together and your knees bent. 2. Raise your top knee, but keep your feet together. Do not let your hips roll back. Your legs should open up like a clamshell.   3. Hold for 6

## 2019-07-25 ASSESSMENT — ENCOUNTER SYMPTOMS
ALLERGIC/IMMUNOLOGIC NEGATIVE: 1
ORTHOPNEA: 0
RESPIRATORY NEGATIVE: 1
BLURRED VISION: 0
SHORTNESS OF BREATH: 0
EYES NEGATIVE: 1
GASTROINTESTINAL NEGATIVE: 1

## 2020-05-11 ENCOUNTER — APPOINTMENT (OUTPATIENT)
Dept: GENERAL RADIOLOGY | Age: 75
End: 2020-05-11
Payer: MEDICARE

## 2020-05-11 ENCOUNTER — APPOINTMENT (OUTPATIENT)
Dept: CT IMAGING | Age: 75
End: 2020-05-11
Payer: MEDICARE

## 2020-05-11 ENCOUNTER — HOSPITAL ENCOUNTER (EMERGENCY)
Age: 75
Discharge: ANOTHER HOSPITAL | End: 2020-05-11
Attending: EMERGENCY MEDICINE
Payer: MEDICARE

## 2020-05-11 VITALS
BODY MASS INDEX: 25.61 KG/M2 | HEART RATE: 86 BPM | OXYGEN SATURATION: 98 % | WEIGHT: 150 LBS | DIASTOLIC BLOOD PRESSURE: 62 MMHG | HEIGHT: 64 IN | RESPIRATION RATE: 18 BRPM | TEMPERATURE: 98.2 F | SYSTOLIC BLOOD PRESSURE: 166 MMHG

## 2020-05-11 LAB
A/G RATIO: 1.3 (ref 1.1–2.2)
ALBUMIN SERPL-MCNC: 4.5 G/DL (ref 3.4–5)
ALP BLD-CCNC: 79 U/L (ref 40–129)
ALT SERPL-CCNC: 21 U/L (ref 10–40)
ANION GAP SERPL CALCULATED.3IONS-SCNC: 9 MMOL/L (ref 3–16)
AST SERPL-CCNC: 45 U/L (ref 15–37)
BASOPHILS ABSOLUTE: 0 K/UL (ref 0–0.2)
BASOPHILS RELATIVE PERCENT: 0.5 %
BILIRUB SERPL-MCNC: 0.5 MG/DL (ref 0–1)
BILIRUBIN URINE: NEGATIVE
BLOOD, URINE: ABNORMAL
BUN BLDV-MCNC: 12 MG/DL (ref 7–20)
CALCIUM SERPL-MCNC: 10 MG/DL (ref 8.3–10.6)
CHLORIDE BLD-SCNC: 104 MMOL/L (ref 99–110)
CLARITY: CLEAR
CO2: 27 MMOL/L (ref 21–32)
COLOR: YELLOW
CREAT SERPL-MCNC: <0.5 MG/DL (ref 0.6–1.2)
EKG ATRIAL RATE: 73 BPM
EKG DIAGNOSIS: NORMAL
EKG P AXIS: 56 DEGREES
EKG P-R INTERVAL: 156 MS
EKG Q-T INTERVAL: 398 MS
EKG QRS DURATION: 82 MS
EKG QTC CALCULATION (BAZETT): 438 MS
EKG R AXIS: -24 DEGREES
EKG T AXIS: 39 DEGREES
EKG VENTRICULAR RATE: 73 BPM
EOSINOPHILS ABSOLUTE: 0 K/UL (ref 0–0.6)
EOSINOPHILS RELATIVE PERCENT: 0.1 %
EPITHELIAL CELLS, UA: 1 /HPF (ref 0–5)
ETHANOL: NORMAL MG/DL (ref 0–0.08)
GFR AFRICAN AMERICAN: >60
GFR NON-AFRICAN AMERICAN: >60
GLOBULIN: 3.4 G/DL
GLUCOSE BLD-MCNC: 119 MG/DL (ref 70–99)
GLUCOSE URINE: NEGATIVE MG/DL
HCT VFR BLD CALC: 43.3 % (ref 36–48)
HEMOGLOBIN: 14.3 G/DL (ref 12–16)
HYALINE CASTS: 2 /LPF (ref 0–8)
INR BLD: 1.12 (ref 0.86–1.14)
KETONES, URINE: 40 MG/DL
LEUKOCYTE ESTERASE, URINE: NEGATIVE
LYMPHOCYTES ABSOLUTE: 1.4 K/UL (ref 1–5.1)
LYMPHOCYTES RELATIVE PERCENT: 15.6 %
MCH RBC QN AUTO: 28.6 PG (ref 26–34)
MCHC RBC AUTO-ENTMCNC: 32.9 G/DL (ref 31–36)
MCV RBC AUTO: 86.8 FL (ref 80–100)
MICROSCOPIC EXAMINATION: YES
MONOCYTES ABSOLUTE: 0.6 K/UL (ref 0–1.3)
MONOCYTES RELATIVE PERCENT: 7.1 %
NEUTROPHILS ABSOLUTE: 6.9 K/UL (ref 1.7–7.7)
NEUTROPHILS RELATIVE PERCENT: 76.7 %
NITRITE, URINE: NEGATIVE
PDW BLD-RTO: 14.2 % (ref 12.4–15.4)
PH UA: 6.5 (ref 5–8)
PLATELET # BLD: 171 K/UL (ref 135–450)
PMV BLD AUTO: 8.1 FL (ref 5–10.5)
POTASSIUM REFLEX MAGNESIUM: 4.4 MMOL/L (ref 3.5–5.1)
PRO-BNP: 385 PG/ML (ref 0–449)
PROTEIN UA: 100 MG/DL
PROTHROMBIN TIME: 13 SEC (ref 10–13.2)
RBC # BLD: 4.99 M/UL (ref 4–5.2)
RBC UA: 15 /HPF (ref 0–4)
SODIUM BLD-SCNC: 140 MMOL/L (ref 136–145)
SPECIFIC GRAVITY UA: 1.02 (ref 1–1.03)
TOTAL CK: 1499 U/L (ref 26–192)
TOTAL PROTEIN: 7.9 G/DL (ref 6.4–8.2)
TROPONIN: <0.01 NG/ML
URINE REFLEX TO CULTURE: ABNORMAL
URINE TYPE: ABNORMAL
UROBILINOGEN, URINE: 0.2 E.U./DL
WBC # BLD: 9 K/UL (ref 4–11)
WBC UA: 1 /HPF (ref 0–5)

## 2020-05-11 PROCEDURE — 80053 COMPREHEN METABOLIC PANEL: CPT

## 2020-05-11 PROCEDURE — 72125 CT NECK SPINE W/O DYE: CPT

## 2020-05-11 PROCEDURE — 96365 THER/PROPH/DIAG IV INF INIT: CPT

## 2020-05-11 PROCEDURE — 36415 COLL VENOUS BLD VENIPUNCTURE: CPT

## 2020-05-11 PROCEDURE — 2500000003 HC RX 250 WO HCPCS: Performed by: EMERGENCY MEDICINE

## 2020-05-11 PROCEDURE — 84484 ASSAY OF TROPONIN QUANT: CPT

## 2020-05-11 PROCEDURE — 51701 INSERT BLADDER CATHETER: CPT

## 2020-05-11 PROCEDURE — 85610 PROTHROMBIN TIME: CPT

## 2020-05-11 PROCEDURE — 2580000003 HC RX 258: Performed by: EMERGENCY MEDICINE

## 2020-05-11 PROCEDURE — 70450 CT HEAD/BRAIN W/O DYE: CPT

## 2020-05-11 PROCEDURE — 85025 COMPLETE CBC W/AUTO DIFF WBC: CPT

## 2020-05-11 PROCEDURE — 83880 ASSAY OF NATRIURETIC PEPTIDE: CPT

## 2020-05-11 PROCEDURE — 71045 X-RAY EXAM CHEST 1 VIEW: CPT

## 2020-05-11 PROCEDURE — 81001 URINALYSIS AUTO W/SCOPE: CPT

## 2020-05-11 PROCEDURE — 93005 ELECTROCARDIOGRAM TRACING: CPT | Performed by: NURSE PRACTITIONER

## 2020-05-11 PROCEDURE — 93010 ELECTROCARDIOGRAM REPORT: CPT | Performed by: INTERNAL MEDICINE

## 2020-05-11 PROCEDURE — G0480 DRUG TEST DEF 1-7 CLASSES: HCPCS

## 2020-05-11 PROCEDURE — 99285 EMERGENCY DEPT VISIT HI MDM: CPT

## 2020-05-11 PROCEDURE — 82550 ASSAY OF CK (CPK): CPT

## 2020-05-11 RX ORDER — 0.9 % SODIUM CHLORIDE 0.9 %
1000 INTRAVENOUS SOLUTION INTRAVENOUS ONCE
Status: COMPLETED | OUTPATIENT
Start: 2020-05-11 | End: 2020-05-11

## 2020-05-11 RX ADMIN — SODIUM CHLORIDE 1000 ML: 9 INJECTION, SOLUTION INTRAVENOUS at 02:50

## 2020-05-11 RX ADMIN — SODIUM CHLORIDE 3 MG/HR: 9 INJECTION, SOLUTION INTRAVENOUS at 04:03

## 2020-05-11 ASSESSMENT — PAIN DESCRIPTION - PAIN TYPE: TYPE: ACUTE PAIN

## 2020-05-11 ASSESSMENT — PAIN SCALES - GENERAL: PAINLEVEL_OUTOF10: 2

## 2020-05-11 ASSESSMENT — PAIN DESCRIPTION - LOCATION: LOCATION: ARM;KNEE

## 2020-05-11 NOTE — ED PROVIDER NOTES
[facility]        Pt Name: Amanda Godinez  MRN: [id number]  [place] 1945  Date of evaluation: 5/11/2020  Provider: SOBEIDA Bergman CNP  PCP: Masoud Braxton MD     I have seen and evaluated this patient with my supervising physician Dr. Briseida Dozier       Chief Complaint   Patient presents with    Fall     patient brought in by [facility] from home for family fimding patient on floor near bathroom r/t fall, patient states no LOC, c/o left arm pain, possible fall at least from 2pm on Sunday, c/o left leg stiffness that is chronic started over the summer. pt states h/o HTN. HISTORY OF PRESENT ILLNESS   (Location, Timing/Onset, Context/Setting, Quality, Duration, Modifying Factors, Severity, Associated Signs and Symptoms)  Note limiting factors. Amanda Godinez is a 66 y.o. female with medical history of carotid artery aneurysm, hypertension, lung nodule who presents the ED for a fall at home that was unwitnessed. Patient lives home alone and is unsure how long she was down. She said she usually walks with a walker, however she was not able to locate her walker and tried using other household items to ambulate and thinks this possibly caused her fall. She denies being anticoagulated. She does recall eating lunch, however she does not recall much afterwards. She does not know how she ended up in the ED it appears her family had found her down and called EMS. She is complaining of some urinary frequency and left arm pain. She does have some tenderness in her left hip, however she said this is not new today. She denies any associated headache, neck pain, back pain, numbness, tingling, weakness, chest pain, shortness of air, cough, lightheaded, dizzy, nausea, vomiting, or diarrhea.      Nursing Notes were all reviewed and agreed with or any disagreements were addressed in the Radiologist below, if available at the time of this note:    XR CHEST PORTABLE   Final Result   1. No acute traumatic abnormality identified. 2. Chronic interstitial lung disease versus interstitial pulmonary edema. CT Head WO Contrast    (Results Pending)   CT Cervical Spine WO Contrast    (Results Pending)   XR HUMERUS LEFT (MIN 2 VIEWS)    (Results Pending)   XR HIP 2-3 VW W PELVIS LEFT    (Results Pending)     No results found. PROCEDURES   Unless otherwise noted below, none     Procedures    CRITICAL CARE TIME   N/A    CONSULTS:  None      EMERGENCY DEPARTMENT COURSE and DIFFERENTIAL DIAGNOSIS/MDM:   Vitals:    Vitals:    05/11/20 0028   BP: (!) 188/100   Pulse: 78   Resp: 18   Temp: 98.2 °F (36.8 °C)   TempSrc: Oral   SpO2: 96%   Weight: 150 lb (68 kg)   Height: 5' 4\" (1.626 m)       Patient was given the following medications:  Medications - No data to display      Pertinent Labs & Imaging studies reviewed. (See chart for details)   -  Patient seen and evaluated in the emergency department. -  Triage and nursing notes reviewed and incorporated. -  Old chart records reviewed and incorporated. -  Patient case discussed with attending physician,  Dr. Jovany Salvador. They saw and examined patient. -  Differential diagnosis includes:  Skull fracture, cervical fracture, SAH, ICH, MI, humerus fracture, femur fracture, pelvic fracture, UTI, rhabdo, POLLY, versus COVID-19  -  Work-up included:  See above CBC, CMP, troponin, BNP, INR, CK, UA, ethanol, EKG, CXR, CT head without, CT cervical spine, x-ray left hip, x-ray left humerus  Pt care turned over to Dr. Jovany Salvador pending results and dispo    FINAL IMPRESSION      1.  Fall, initial encounter          DISPOSITION/PLAN   DISPOSITION             (Please note that portions of this note were completed with a voice recognition program.  Efforts were made to edit the dictations but occasionally words are mis-transcribed.)    Travis Menchaca

## 2020-05-11 NOTE — ED NOTES
Patient alert and oriented to person, place and situation. Patient usually independent with ambulation and states that she plays golf regularly. Patient does states that she has had some memory issues lately and ambulation issues where she was looking for a cane as an assistive device but was unable to find one. Patient states that is was April 1980 during assessment. Pt updated on POC. Pt positioned for comfort. Call light in reach. Bed locked and in low position. Pt denies any needs or concerns at this time. Will continue to monitor.       Munir Roman RN  05/11/20 0045

## 2020-11-10 ENCOUNTER — CARE COORDINATION (OUTPATIENT)
Dept: CARE COORDINATION | Age: 75
End: 2020-11-10

## 2020-11-13 ENCOUNTER — CARE COORDINATION (OUTPATIENT)
Dept: CARE COORDINATION | Age: 75
End: 2020-11-13

## 2020-12-16 ENCOUNTER — TELEPHONE (OUTPATIENT)
Dept: INTERNAL MEDICINE CLINIC | Age: 75
End: 2020-12-16

## 2020-12-16 NOTE — TELEPHONE ENCOUNTER
----- Message from Steven Motta sent at 12/15/2020 12:05 PM EST -----  Subject: Message to Provider    QUESTIONS  Information for Provider? patient is wanting to make sure her medications   list was sent over to the office from her nursing home.   ---------------------------------------------------------------------------  --------------  [address]  What is the best way for the office to contact you? OK to leave message on   voicemail  Preferred Call Back Phone Number? [phone]  ---------------------------------------------------------------------------  --------------  SCRIPT ANSWERS  Relationship to Patient? Other  Representative Name? Vivian Jacques   Is the Representative on the appropriate HIPAA document in Epic?  Yes

## 2020-12-21 ENCOUNTER — OFFICE VISIT (OUTPATIENT)
Dept: PRIMARY CARE CLINIC | Age: 75
End: 2020-12-21
Payer: MEDICARE

## 2020-12-21 PROCEDURE — 99211 OFF/OP EST MAY X REQ PHY/QHP: CPT | Performed by: NURSE PRACTITIONER

## 2020-12-22 LAB — SARS-COV-2, NAA: NOT DETECTED

## 2020-12-22 NOTE — PROGRESS NOTES
Adriana Hearn received a viral test for COVID-19. They were educated on isolation and quarantine as appropriate. For any symptoms, they were directed to seek care from their PCP, given contact information to establish with a doctor, directed to an urgent care or the emergency room.

## 2020-12-22 NOTE — PATIENT INSTRUCTIONS

## 2020-12-24 ENCOUNTER — TELEPHONE (OUTPATIENT)
Dept: INTERNAL MEDICINE CLINIC | Age: 75
End: 2020-12-24

## 2020-12-24 NOTE — TELEPHONE ENCOUNTER
----- Message from Rose Shrestha sent at 12/23/2020 12:37 PM EST -----  Subject: Message to Provider    QUESTIONS  Information for Provider? Granddaughter   Kathleen Olivares has paperwork that needs to be filled out to move her grandmother   into assisted living [facility]) on Jan 2nd and needs this done by then   please let her know what she needs to do to have this done  ---------------------------------------------------------------------------  --------------  [address]  What is the best way for the office to contact you? OK to leave message on   voicemail  Preferred Call Back Phone Number? [phone]  ---------------------------------------------------------------------------  --------------  SCRIPT ANSWERS  Relationship to Patient? Other  Representative Name? Azalea Wolf-pati  Is the Representative on the appropriate HIPAA document in Epic?  Yes

## 2020-12-29 ENCOUNTER — TELEPHONE (OUTPATIENT)
Dept: INTERNAL MEDICINE CLINIC | Age: 75
End: 2020-12-29

## 2020-12-29 NOTE — TELEPHONE ENCOUNTER
----- Message from Merlin Orourkeeste sent at 12/22/2020  2:00 PM EST -----  Subject: Message to Provider    QUESTIONS  Information for Provider? Pt is transitioning to assisted living facility   and is needing Dr. Polo Groves to provide medical records including history   and a medication list.  ---------------------------------------------------------------------------  --------------  CALL BACK INFO  What is the best way for the office to contact you? OK to leave message on   voicemail  Preferred Call Back Phone Number? [phone]  ---------------------------------------------------------------------------  --------------  SCRIPT ANSWERS  Relationship to Patient?  Self

## 2020-12-31 ENCOUNTER — TELEPHONE (OUTPATIENT)
Dept: INTERNAL MEDICINE CLINIC | Age: 75
End: 2020-12-31

## 2020-12-31 NOTE — TELEPHONE ENCOUNTER
Gale Winston called from [facility], needs a list of medications for the patient before she can be moved in, hoping to have that done on Monday, 1/4/21.

## 2021-01-10 ENCOUNTER — TELEPHONE (OUTPATIENT)
Dept: INTERNAL MEDICINE CLINIC | Age: 76
End: 2021-01-10

## 2021-01-10 NOTE — TELEPHONE ENCOUNTER
----- Message from Eduar Talavera sent at 12/30/2020 11:32 AM EST -----  Subject: Message to Provider    QUESTIONS  Information for Provider? Wants to  covid test results for PT needs   to drop them off to her employer for an apt at 2. Please Call.   ---------------------------------------------------------------------------  --------------  [employer] INFO  What is the best way for the office to contact you? OK to leave message on   voicemail  Preferred Call Back Phone Number? [phone]  ---------------------------------------------------------------------------  --------------  SCRIPT ANSWERS  Relationship to Patient? Other  Representative Name? Vanessa Francisco   Is the Representative on the appropriate HIPAA document in Epic?  Yes

## 2021-01-10 NOTE — TELEPHONE ENCOUNTER
COVID  Test   NEGATIVE   12/21/2020    Pt may have results. They would only be reliable for current date.